# Patient Record
Sex: FEMALE | Race: BLACK OR AFRICAN AMERICAN | NOT HISPANIC OR LATINO | URBAN - METROPOLITAN AREA
[De-identification: names, ages, dates, MRNs, and addresses within clinical notes are randomized per-mention and may not be internally consistent; named-entity substitution may affect disease eponyms.]

---

## 2021-09-10 ENCOUNTER — EMERGENCY (EMERGENCY)
Facility: HOSPITAL | Age: 26
LOS: 1 days | Discharge: HOME | End: 2021-09-10
Attending: EMERGENCY MEDICINE
Payer: COMMERCIAL

## 2021-09-10 VITALS
HEIGHT: 67 IN | WEIGHT: 245.82 LBS | HEART RATE: 76 BPM | RESPIRATION RATE: 14 BRPM | OXYGEN SATURATION: 99 % | TEMPERATURE: 98 F | SYSTOLIC BLOOD PRESSURE: 124 MMHG | DIASTOLIC BLOOD PRESSURE: 72 MMHG

## 2021-09-10 DIAGNOSIS — Z98.890 OTHER SPECIFIED POSTPROCEDURAL STATES: Chronic | ICD-10-CM

## 2021-09-10 LAB
ANION GAP SERPL CALC-SCNC: 14 MMOL/L — SIGNIFICANT CHANGE UP (ref 5–17)
APTT BLD: 26.4 SEC — LOW (ref 27.5–35.5)
BASOPHILS # BLD AUTO: 0.04 K/UL — SIGNIFICANT CHANGE UP (ref 0–0.2)
BASOPHILS NFR BLD AUTO: 0.4 % — SIGNIFICANT CHANGE UP (ref 0–2)
BUN SERPL-MCNC: 16.8 MG/DL — SIGNIFICANT CHANGE UP (ref 8–20)
CALCIUM SERPL-MCNC: 9.2 MG/DL — SIGNIFICANT CHANGE UP (ref 8.6–10.2)
CHLORIDE SERPL-SCNC: 100 MMOL/L — SIGNIFICANT CHANGE UP (ref 98–107)
CO2 SERPL-SCNC: 21 MMOL/L — LOW (ref 22–29)
CREAT SERPL-MCNC: 0.82 MG/DL — SIGNIFICANT CHANGE UP (ref 0.5–1.3)
EOSINOPHIL # BLD AUTO: 0.19 K/UL — SIGNIFICANT CHANGE UP (ref 0–0.5)
EOSINOPHIL NFR BLD AUTO: 1.9 % — SIGNIFICANT CHANGE UP (ref 0–6)
GLUCOSE SERPL-MCNC: 86 MG/DL — SIGNIFICANT CHANGE UP (ref 70–99)
HCT VFR BLD CALC: 37.9 % — SIGNIFICANT CHANGE UP (ref 34.5–45)
HGB BLD-MCNC: 11.9 G/DL — SIGNIFICANT CHANGE UP (ref 11.5–15.5)
IMM GRANULOCYTES NFR BLD AUTO: 0.3 % — SIGNIFICANT CHANGE UP (ref 0–1.5)
INR BLD: 1.09 RATIO — SIGNIFICANT CHANGE UP (ref 0.88–1.16)
LYMPHOCYTES # BLD AUTO: 3.44 K/UL — HIGH (ref 1–3.3)
LYMPHOCYTES # BLD AUTO: 34.6 % — SIGNIFICANT CHANGE UP (ref 13–44)
MCHC RBC-ENTMCNC: 25.5 PG — LOW (ref 27–34)
MCHC RBC-ENTMCNC: 31.4 GM/DL — LOW (ref 32–36)
MCV RBC AUTO: 81.2 FL — SIGNIFICANT CHANGE UP (ref 80–100)
MONOCYTES # BLD AUTO: 0.66 K/UL — SIGNIFICANT CHANGE UP (ref 0–0.9)
MONOCYTES NFR BLD AUTO: 6.6 % — SIGNIFICANT CHANGE UP (ref 2–14)
NEUTROPHILS # BLD AUTO: 5.58 K/UL — SIGNIFICANT CHANGE UP (ref 1.8–7.4)
NEUTROPHILS NFR BLD AUTO: 56.2 % — SIGNIFICANT CHANGE UP (ref 43–77)
PLATELET # BLD AUTO: 252 K/UL — SIGNIFICANT CHANGE UP (ref 150–400)
POTASSIUM SERPL-MCNC: 4.8 MMOL/L — SIGNIFICANT CHANGE UP (ref 3.5–5.3)
POTASSIUM SERPL-SCNC: 4.8 MMOL/L — SIGNIFICANT CHANGE UP (ref 3.5–5.3)
PROTHROM AB SERPL-ACNC: 12.6 SEC — SIGNIFICANT CHANGE UP (ref 10.6–13.6)
RBC # BLD: 4.67 M/UL — SIGNIFICANT CHANGE UP (ref 3.8–5.2)
RBC # FLD: 13.3 % — SIGNIFICANT CHANGE UP (ref 10.3–14.5)
SODIUM SERPL-SCNC: 135 MMOL/L — SIGNIFICANT CHANGE UP (ref 135–145)
WBC # BLD: 9.94 K/UL — SIGNIFICANT CHANGE UP (ref 3.8–10.5)
WBC # FLD AUTO: 9.94 K/UL — SIGNIFICANT CHANGE UP (ref 3.8–10.5)

## 2021-09-10 PROCEDURE — 99284 EMERGENCY DEPT VISIT MOD MDM: CPT

## 2021-09-10 PROCEDURE — 85610 PROTHROMBIN TIME: CPT

## 2021-09-10 PROCEDURE — 99283 EMERGENCY DEPT VISIT LOW MDM: CPT

## 2021-09-10 PROCEDURE — 85730 THROMBOPLASTIN TIME PARTIAL: CPT

## 2021-09-10 PROCEDURE — 80048 BASIC METABOLIC PNL TOTAL CA: CPT

## 2021-09-10 PROCEDURE — 36415 COLL VENOUS BLD VENIPUNCTURE: CPT

## 2021-09-10 PROCEDURE — 85025 COMPLETE CBC W/AUTO DIFF WBC: CPT

## 2021-09-10 RX ORDER — APIXABAN 2.5 MG/1
2 TABLET, FILM COATED ORAL
Qty: 120 | Refills: 0
Start: 2021-09-10 | End: 2021-10-09

## 2021-09-10 RX ORDER — APIXABAN 2.5 MG/1
10 TABLET, FILM COATED ORAL ONCE
Refills: 0 | Status: COMPLETED | OUTPATIENT
Start: 2021-09-10 | End: 2021-09-10

## 2021-09-10 RX ADMIN — APIXABAN 10 MILLIGRAM(S): 2.5 TABLET, FILM COATED ORAL at 22:40

## 2021-09-10 NOTE — ED PROVIDER NOTE - OBJECTIVE STATEMENT
27 y/o female with recent bunion surgery on right foot, POD #5 presents to the ED with calf pain and DVT in R calf on outpatient imaging. Notes right calf pain started 5 days ago. No active chest pain , sob, fevers, chills, no hx of blood clots, no bleeding disorders in the past. Notes she had intermittent chest pain since covid vaccine in april 2021. Seen cardio for this with negative workup. No change in episodes of chest pain. Denies shortness of rbeath

## 2021-09-10 NOTE — ED ADULT TRIAGE NOTE - CHIEF COMPLAINT QUOTE
Patient A&Ox4 complaining of pain to LLE, stated had bunion surgery 08/17/2021. Was experiencing pain to back of right calf x5 days. Went to MD for followup & had US at [facility], was told she is positive for DVT.

## 2021-09-10 NOTE — ED ADULT NURSE NOTE - OBJECTIVE STATEMENT
Assumed care of pt at 21:24 in . Pt A&Ox3 c/o dx'ed DVT secondary to RLE fx. Pt dx'ed w/ DVT outpatient, Assumed care of pt at 21:24 in . Pt A&Ox3 c/o dx'ed DVT secondary to RLE fx. Pt dx'ed w/ DVT outpatient, told to f/u W/ ED for further intervention. Pt denies difficulty breathing, SOB, increased swelling.

## 2021-09-10 NOTE — ED PROVIDER NOTE - CARE PROVIDER_API CALL
Kal Osborne)  Hematology; Internal Medicine; Medical Oncology  [address] [place] [address]  Phone: [phone]  Fax: [phone]  Follow Up Time:

## 2021-09-10 NOTE — ED PROVIDER NOTE - PATIENT PORTAL LINK FT
You can access the FollowMyHealth Patient Portal offered by [facility] by registering at the following website: [email or web address]. By joining [facility]’s FollowMyHealth portal, you will also be able to view your health information using other applications (apps) compatible with our system.

## 2021-09-10 NOTE — ED ADULT NURSE NOTE - NS ED NURSE LEVEL OF CONSCIOUSNESS SPEECH
SUBJECTIVE:  The patient is here basically for medication refills.  She was here with her sister and a Chinese .  It ended up her vitamin D high dose she was not taking, it really was not clear why but the sister helped convince Bridgette that she should finish that out.  The trazodone seems to be helping with sleep and stool softeners working well for constipation.  She did have some significant problem with hemorrhoids previously but it sounds like that is doing well and then she had a different cream for her itching and dry skin that she apparently got from [facility] which is working better than what I prescribed for her before so they wanted a refill of that which was ammonium lactate 12% cream.      OBJECTIVE:  On exam, the patient is in no acute distress.  Vital signs are stable.  No further exam was done.      IMPRESSION:  Stable medical problems.      PLAN:  We actually did not need refills of anything other than the ammonium lactate cream which I went ahead and prescribed for her with refills.        Visit length 15 minutes, all spent counseling about her medications.        Speaking Coherently

## 2021-09-10 NOTE — ED PROVIDER NOTE - ATTENDING CONTRIBUTION TO CARE
Dr. Munoz : I have personally seen and examined this patient at the bedside. I have fully participated in the care of this patient. I have reviewed all pertinent clinical information, including history, physical exam, plan and the pa's note and agree except as noted.     27 y/o female with recent bunion surgery on right foot, POD #5 presents to the ED with calf pain and DVT in R calf on outpatient imaging. pain in calf x 5 days. . Notes she had intermittent chest pain since covid vaccine in april 2021. Seen cardio for this with negative workup. no new cp since the calf pain.  No change       Denies f/c/n/v/sob/palpitations/cough/abd.pain/d/c/dysuria/hematuria. no sick contacts/recent travel.    PE:  head; atraumatic normocephalic  eyes: perrla  Heart: rrr s1s2  lungs: ctab  abd: soft, nt nd + bs no rebound/guarding no cva ttp  le: right calf ttp and swelling   back: no midline cervical/thoracic/lumbar ttp      -->calf dvt; labs place on AC dc

## 2021-09-10 NOTE — ED PROVIDER NOTE - CLINICAL SUMMARY MEDICAL DECISION MAKING FREE TEXT BOX
25 y/o female with recent bunion surgery on right foot, POD #5 presents to the ED with calf pain and DVT in R calf on outpatient imaging. Outpatient us shows dvt, (scanned in chart), intermittent chest pain since arpil, has been worked up by cardio, no active chest pain, no shortness of breath, not tachycardic, sating at 99%. labs show no abnormalities in kidney function

## 2021-09-17 PROBLEM — Z78.9 OTHER SPECIFIED HEALTH STATUS: Chronic | Status: ACTIVE | Noted: 2021-09-10

## 2021-09-24 ENCOUNTER — OUTPATIENT (OUTPATIENT)
Dept: OUTPATIENT SERVICES | Facility: HOSPITAL | Age: 26
LOS: 1 days | Discharge: HOME | End: 2021-09-24

## 2021-09-24 DIAGNOSIS — I82.A22 CHRONIC EMBOLISM AND THROMBOSIS OF LEFT AXILLARY VEIN: ICD-10-CM

## 2021-09-24 DIAGNOSIS — Z98.890 OTHER SPECIFIED POSTPROCEDURAL STATES: Chronic | ICD-10-CM

## 2021-09-24 PROBLEM — Z00.00 ENCOUNTER FOR PREVENTIVE HEALTH EXAMINATION: Status: ACTIVE | Noted: 2021-09-24

## 2021-09-28 ENCOUNTER — APPOINTMENT (OUTPATIENT)
Dept: HEMATOLOGY ONCOLOGY | Facility: CLINIC | Age: 26
End: 2021-09-28
Payer: MEDICAID

## 2021-09-28 ENCOUNTER — RESULT REVIEW (OUTPATIENT)
Age: 26
End: 2021-09-28

## 2021-09-28 ENCOUNTER — OUTPATIENT (OUTPATIENT)
Dept: OUTPATIENT SERVICES | Facility: HOSPITAL | Age: 26
LOS: 1 days | End: 2021-09-28

## 2021-09-28 VITALS
DIASTOLIC BLOOD PRESSURE: 71 MMHG | BODY MASS INDEX: 37.54 KG/M2 | HEIGHT: 67.5 IN | OXYGEN SATURATION: 97 % | WEIGHT: 242 LBS | HEART RATE: 62 BPM | SYSTOLIC BLOOD PRESSURE: 106 MMHG

## 2021-09-28 DIAGNOSIS — Z78.9 OTHER SPECIFIED HEALTH STATUS: ICD-10-CM

## 2021-09-28 DIAGNOSIS — Z82.3 FAMILY HISTORY OF STROKE: ICD-10-CM

## 2021-09-28 DIAGNOSIS — I82.A22 CHRONIC EMBOLISM AND THROMBOSIS OF LEFT AXILLARY VEIN: ICD-10-CM

## 2021-09-28 DIAGNOSIS — Z82.49 FAMILY HISTORY OF ISCHEMIC HEART DISEASE AND OTHER DISEASES OF THE CIRCULATORY SYSTEM: ICD-10-CM

## 2021-09-28 DIAGNOSIS — Z98.890 OTHER SPECIFIED POSTPROCEDURAL STATES: Chronic | ICD-10-CM

## 2021-09-28 LAB
BASOPHILS # BLD AUTO: 0.1 K/UL — SIGNIFICANT CHANGE UP (ref 0–0.2)
BASOPHILS NFR BLD AUTO: 1.1 % — SIGNIFICANT CHANGE UP (ref 0–2)
EOSINOPHIL # BLD AUTO: 0.3 K/UL — SIGNIFICANT CHANGE UP (ref 0–0.5)
EOSINOPHIL NFR BLD AUTO: 3.4 % — SIGNIFICANT CHANGE UP (ref 0–6)
HCT VFR BLD CALC: 38.5 % — SIGNIFICANT CHANGE UP (ref 34.5–45)
HGB BLD-MCNC: 11.9 G/DL — SIGNIFICANT CHANGE UP (ref 11.5–15.5)
LYMPHOCYTES # BLD AUTO: 2.2 K/UL — SIGNIFICANT CHANGE UP (ref 1–3.3)
LYMPHOCYTES # BLD AUTO: 28.8 % — SIGNIFICANT CHANGE UP (ref 13–44)
MCHC RBC-ENTMCNC: 25.5 PG — LOW (ref 27–34)
MCHC RBC-ENTMCNC: 31 G/DL — LOW (ref 32–36)
MCV RBC AUTO: 82.3 FL — SIGNIFICANT CHANGE UP (ref 80–100)
MONOCYTES # BLD AUTO: 0.7 K/UL — SIGNIFICANT CHANGE UP (ref 0–0.9)
MONOCYTES NFR BLD AUTO: 8.5 % — SIGNIFICANT CHANGE UP (ref 2–14)
NEUTROPHILS # BLD AUTO: 4.5 K/UL — SIGNIFICANT CHANGE UP (ref 1.8–7.4)
NEUTROPHILS NFR BLD AUTO: 58.2 % — SIGNIFICANT CHANGE UP (ref 43–77)
PLATELET # BLD AUTO: 270 K/UL — SIGNIFICANT CHANGE UP (ref 150–400)
RBC # BLD: 4.68 M/UL — SIGNIFICANT CHANGE UP (ref 3.8–5.2)
RBC # FLD: 12.4 % — SIGNIFICANT CHANGE UP (ref 10.3–14.5)
WBC # BLD: 7.7 K/UL — SIGNIFICANT CHANGE UP (ref 3.8–10.5)
WBC # FLD AUTO: 7.7 K/UL — SIGNIFICANT CHANGE UP (ref 3.8–10.5)

## 2021-09-28 PROCEDURE — 99204 OFFICE O/P NEW MOD 45 MIN: CPT

## 2021-09-28 RX ORDER — APIXABAN 5 MG/1
5 TABLET, FILM COATED ORAL
Qty: 60 | Refills: 6 | Status: ACTIVE | COMMUNITY
Start: 2021-09-28 | End: 1900-01-01

## 2021-09-28 RX ORDER — APIXABAN 5 MG/1
5 TABLET, FILM COATED ORAL
Refills: 0 | Status: ACTIVE | COMMUNITY

## 2021-09-28 NOTE — ASSESSMENT
[FreeTextEntry1] : 27 y/o with no significant past medical history s/p recent R bunion surgery on 8/17/21 \par Post surgery she developed a  Rt LE  DVTon 9/10/21 \par \par Patient with what appears to be an unprovoked DVT\par - denies personal or family h/o VTE prior to this\par - Does not defeinitively require a Hypercoaguble w/u at at this time, However MOm is concerned about possibility of hypercoagubility so requests for it \par -Will send  Prothrombin gene mutation/ factor V leiden mutation- Send Cardiolipin / B2 Glycoprotein/ ATIII/ Protein C/ s. Cannot sent Lupus AC given that she is on eliquis. Low index of suspicion for LA\par - Patient understands that he will require 3- 6 months of AC for a LE DVT\par - Extended AC beyond that would depend on the hypercoagulable w/u\par - Prescription refills for Eliquis sent to [facility] \par F/U in 3 months

## 2021-09-28 NOTE — HISTORY OF PRESENT ILLNESS
[de-identified] : 25 y/o with no significant past medical history presents today s/p recent R bunion surgery on 8/17/21 \par  She was referred to our office with a Rt LE  DVTon 9/10/21 post surgery \par \par Patient was not on any AC prophylaxis post surgery She was given ketorolac and antibiotics\par \par No Hx of Anemia \par No personal or FHx of VTE \par \par Mother - 1 miscarriage \par \par \par \par DATA: \par  09/10/2021:Doppler US DVT in right vein in the proximal right calf \par Patient presented to [facility] and was started on Eliquis\par  [de-identified] : Patient here today with initial visit with her mom \par \par Eliquis - currently on 5 bid . Tolerating it well. Reports fatigue \par Had her period, with heavy bleeding \par no other significant bleeding with eliquis\par \par Recieved COVID vaccine 04/21 - Experienced chest pain but resolved in a few hours\par  \par \par

## 2021-10-04 ENCOUNTER — APPOINTMENT (OUTPATIENT)
Dept: VASCULAR SURGERY | Facility: CLINIC | Age: 26
End: 2021-10-04
Payer: MEDICAID

## 2021-10-04 VITALS — HEART RATE: 66 BPM | OXYGEN SATURATION: 99 % | DIASTOLIC BLOOD PRESSURE: 72 MMHG | SYSTOLIC BLOOD PRESSURE: 110 MMHG

## 2021-10-04 PROCEDURE — 99203 OFFICE O/P NEW LOW 30 MIN: CPT

## 2021-10-04 NOTE — HISTORY OF PRESENT ILLNESS
[FreeTextEntry1] : 25 y/o with no significant past medical history presents for evaluation of a provoked RLE DVT.\par She underwent right foot bunion surgery on 8/17/21 and had limited ambulation after the surgery\par She developed right calf pain and right foot swelling. A venous duplex performed on 9/10/21 revealed right posterior tibial DVT. Right calf swelling has since resolved.\par She uses knee high compression stockings daily\par She has since been on anticoagulation with Eliquis. She was recently seen by hematology and a hypercoagulable work-up is in process.\par

## 2021-10-04 NOTE — ASSESSMENT
[FreeTextEntry1] : 26 year old female with a provoked right posterior tibial DVT following right foot podiatric surgery.\par Her swelling and pain have resolved. She remains on therapeutic AC with Eliquis.\par She will return in December (after 3 months of AC) for a repeat venous US. If DVT has resolved and her hypercoagulable work-up is negative, I will discontinue her anticoagulation

## 2021-10-04 NOTE — PHYSICAL EXAM
[Normal Breath Sounds] : Normal breath sounds [Normal Rate and Rhythm] : normal rate and rhythm [2+] : left 2+ [JVD] : no jugular venous distention  [de-identified] : Well appearing [de-identified] : NCAT

## 2021-10-07 LAB
ALBUMIN SERPL ELPH-MCNC: 4.4 G/DL
ALP BLD-CCNC: 68 U/L
ALT SERPL-CCNC: 9 U/L
ANION GAP SERPL CALC-SCNC: 11 MMOL/L
AST SERPL-CCNC: 15 U/L
AT III PPP CHRO-ACNC: 121 %
B2 GLYCOPROT1 IGA SERPL IA-ACNC: <5 SAU
B2 GLYCOPROT1 IGG SER-ACNC: <5 SGU
B2 GLYCOPROT1 IGM SER-ACNC: <5 SMU
BILIRUB SERPL-MCNC: 0.2 MG/DL
BUN SERPL-MCNC: 13 MG/DL
CALCIUM SERPL-MCNC: 9.7 MG/DL
CARDIOLIPIN IGM SER-MCNC: 15 MPL
CARDIOLIPIN IGM SER-MCNC: 5.2 GPL
CHLORIDE SERPL-SCNC: 104 MMOL/L
CO2 SERPL-SCNC: 24 MMOL/L
CREAT SERPL-MCNC: 0.82 MG/DL
DEPRECATED D DIMER PPP IA-ACNC: <150 NG/ML DDU
DNA PLOIDY SPEC FC-IMP: NORMAL
GLUCOSE SERPL-MCNC: 96 MG/DL
POTASSIUM SERPL-SCNC: 4.2 MMOL/L
PROT C PPP CHRO-ACNC: 130 %
PROT S FREE PPP-ACNC: 102 %
PROT SERPL-MCNC: 7.7 G/DL
PTR INTERP: NORMAL
SODIUM SERPL-SCNC: 140 MMOL/L

## 2021-12-13 ENCOUNTER — APPOINTMENT (OUTPATIENT)
Dept: VASCULAR SURGERY | Facility: CLINIC | Age: 26
End: 2021-12-13
Payer: MEDICAID

## 2021-12-13 VITALS — DIASTOLIC BLOOD PRESSURE: 79 MMHG | OXYGEN SATURATION: 97 % | HEART RATE: 78 BPM | SYSTOLIC BLOOD PRESSURE: 125 MMHG

## 2021-12-13 PROCEDURE — 99212 OFFICE O/P EST SF 10 MIN: CPT

## 2021-12-13 PROCEDURE — 93971 EXTREMITY STUDY: CPT

## 2021-12-13 NOTE — HISTORY OF PRESENT ILLNESS
[FreeTextEntry1] : 25 y/o with no significant past medical history presents for follow upof a provoked RLE DVT.\par She underwent right foot bunion surgery on 8/17/21 and had limited ambulation after the surgery\par She developed right calf pain and right foot swelling. A venous duplex performed on 9/10/21 revealed right posterior tibial DVT. Right calf swelling has since resolved.\par She uses knee high compression stockings daily\par She has since been on anticoagulation with Eliquis. She was recently seen by hematology and a hypercoagulable work-up is in process.\par  [de-identified] : Patient was last seen 2 months ago\par She remains on anticoagulation with Eliquis\par Right leg edema has resolved

## 2021-12-13 NOTE — ASSESSMENT
[FreeTextEntry1] : 26 year old female with a provoked right posterior tibial DVT following right foot podiatric surgery in August 2021\par Her swelling and pain have resolved. \par She has been on therapeutic AC with Eliquis for a 3 month course\par Right leg venous ultrasound today shows no DVT or SVT.\par -Patient is scheduled to see her hematologist next week. If her hypercoagulable work-up is negative, i recommend immediate discontinuation of AC as her DVT has fully resolved and she has completed a 3 month course of AC\par -Patient advised to continue using compression stockings particularly with long distance travel\par -She may return PRN\par

## 2021-12-13 NOTE — PHYSICAL EXAM
[JVD] : no jugular venous distention  [Normal Breath Sounds] : Normal breath sounds [Normal Rate and Rhythm] : normal rate and rhythm [2+] : left 2+ [Ankle Swelling (On Exam)] : not present [de-identified] : Well appearing [de-identified] : NCAT

## 2021-12-21 ENCOUNTER — OUTPATIENT (OUTPATIENT)
Dept: OUTPATIENT SERVICES | Facility: HOSPITAL | Age: 26
LOS: 1 days | Discharge: HOME | End: 2021-12-21

## 2021-12-21 DIAGNOSIS — Z98.890 OTHER SPECIFIED POSTPROCEDURAL STATES: Chronic | ICD-10-CM

## 2021-12-21 DIAGNOSIS — I82.A22 CHRONIC EMBOLISM AND THROMBOSIS OF LEFT AXILLARY VEIN: ICD-10-CM

## 2021-12-22 ENCOUNTER — RESULT REVIEW (OUTPATIENT)
Age: 26
End: 2021-12-22

## 2021-12-22 ENCOUNTER — APPOINTMENT (OUTPATIENT)
Dept: HEMATOLOGY ONCOLOGY | Facility: CLINIC | Age: 26
End: 2021-12-22
Payer: MEDICAID

## 2021-12-22 VITALS
SYSTOLIC BLOOD PRESSURE: 104 MMHG | HEIGHT: 67.5 IN | BODY MASS INDEX: 36.46 KG/M2 | WEIGHT: 235.02 LBS | OXYGEN SATURATION: 97 % | HEART RATE: 69 BPM | DIASTOLIC BLOOD PRESSURE: 69 MMHG

## 2021-12-22 LAB
BASOPHILS # BLD AUTO: 0.1 K/UL — SIGNIFICANT CHANGE UP (ref 0–0.2)
BASOPHILS NFR BLD AUTO: 1.2 % — SIGNIFICANT CHANGE UP (ref 0–2)
EOSINOPHIL # BLD AUTO: 0.1 K/UL — SIGNIFICANT CHANGE UP (ref 0–0.5)
EOSINOPHIL NFR BLD AUTO: 0.9 % — SIGNIFICANT CHANGE UP (ref 0–6)
HCT VFR BLD CALC: 43.4 % — SIGNIFICANT CHANGE UP (ref 34.5–45)
HGB BLD-MCNC: 13 G/DL — SIGNIFICANT CHANGE UP (ref 11.5–15.5)
LYMPHOCYTES # BLD AUTO: 2.3 K/UL — SIGNIFICANT CHANGE UP (ref 1–3.3)
LYMPHOCYTES # BLD AUTO: 34.9 % — SIGNIFICANT CHANGE UP (ref 13–44)
MCHC RBC-ENTMCNC: 25.4 PG — LOW (ref 27–34)
MCHC RBC-ENTMCNC: 29.9 G/DL — LOW (ref 32–36)
MCV RBC AUTO: 84.8 FL — SIGNIFICANT CHANGE UP (ref 80–100)
MONOCYTES # BLD AUTO: 0.6 K/UL — SIGNIFICANT CHANGE UP (ref 0–0.9)
MONOCYTES NFR BLD AUTO: 9 % — SIGNIFICANT CHANGE UP (ref 2–14)
NEUTROPHILS # BLD AUTO: 3.6 K/UL — SIGNIFICANT CHANGE UP (ref 1.8–7.4)
NEUTROPHILS NFR BLD AUTO: 54.1 % — SIGNIFICANT CHANGE UP (ref 43–77)
PLATELET # BLD AUTO: 258 K/UL — SIGNIFICANT CHANGE UP (ref 150–400)
RBC # BLD: 5.11 M/UL — SIGNIFICANT CHANGE UP (ref 3.8–5.2)
RBC # FLD: 12.4 % — SIGNIFICANT CHANGE UP (ref 10.3–14.5)
WBC # BLD: 6.6 K/UL — SIGNIFICANT CHANGE UP (ref 3.8–10.5)
WBC # FLD AUTO: 6.6 K/UL — SIGNIFICANT CHANGE UP (ref 3.8–10.5)

## 2021-12-22 PROCEDURE — 99214 OFFICE O/P EST MOD 30 MIN: CPT

## 2021-12-22 NOTE — ADDENDUM
[FreeTextEntry1] : Documented by Greg Jacques acting as scribe for Dr. Darling on 09/28/2021. \par \par All Medical record entries made by the Scribe were at my, Dr. Darling's, direction and personally dictated by me on 09/28/2021. I have reviewed the chart and agree that the record accurately reflects my personal performance of the history, physical exam, assessment and plan. I have also personally directed, reviewed, and agreed with the discharge instructions.

## 2021-12-22 NOTE — HISTORY OF PRESENT ILLNESS
[de-identified] : 25 y/o with no significant past medical history presents today s/p recent R bunion surgery on 8/17/21 \par She was referred to our office with a Rt LE  DVT on 9/10/21 post surgery.\par \par Patient was not on any AC prophylaxis post surgery She was given ketorolac and antibiotics\par \par No Hx of Anemia \par No personal or FHx of VTE \par \par Mother - 1 miscarriage \par \par \par \par DATA: \par  09/10/2021:Doppler US DVT in right vein in the proximal right calf \par Patient presented to [facility] and was started on Eliquis\par  [de-identified] : Patient here today for follow up.\par Saw vascular surgeon, Dr. Lennon, on 12/13/21: Right leg venous US: No evidence of DVT or SVT. \par \par Eliquis - currently on 5 BID. Tolerating it well. \par Had her irregular periods, reports severe menstrual cramps  \par no other significant bleeding with eliquis\par \par Recieved COVID vaccine 04/21 - Experienced chest pain but resolved in a few hours\par \par 09/28/2021 Factor V Leiden Normal, Prothrombin Gene Mutation Normal, Protein C 130%, Protein S 102%, Beta 2 Glycoprotein IgM < 5, Beta 2 Glycoprotein IgG <5, Beta 2 Glycoprotein IgA <5, Anticardiolipin IgM 15, Anticardiolipin IgG 5.2 \par \par Notes that she is traveling to [facility] on Jan. 12, 2022 for a week. Advised compression stockings and walk on the plane.   \par

## 2021-12-22 NOTE — ASSESSMENT
[FreeTextEntry1] : 27 y/o with no significant past medical history s/p recent R bunion surgery on 8/17/21. \par Post surgery she developed a  Rt LE  DVT on 9/10/21 \par Repeat Doppler in Dec 2021 per Vascular NO evidence of DVT SVT\par \par \par Patient with what appears to be an unprovoked DVT\par - denies personal or family h/o VTE prior to this\par - hypercoaguble w/u done which was essentially negative \par    09/28/2021 Prothrombin gene mutation/Factor V Leiden mutation: Negative. Protein C 130%, Protein S 102%, Beta 2 Glycoprotein IgM < 5, Beta 2 Glycoprotein IgG <5, Beta 2 Glycoprotein IgA <5, Anticardiolipin IgG 5.2 \par - GIven INdeterminate anticardiolipin IgM  AB level, at 15, will repeat Cardiolipin IgM today. \par - Patient reports that she is traveling to [facility] on Jan. 12, 2022 for a week. Advised compression stockings and walk on the plane.   She will continue eliquis till she returns from [facility] and them discontinue\par - she is aware that having 1 VTE puts her at a higher risk of subsequent VTE\par - In 2 months will order Lupus AC when she is off the Eliquis\par - F/U in 3-4 months

## 2021-12-28 LAB
CARDIOLIPIN IGM SER-MCNC: 7.9 MPL
CARDIOLIPIN IGM SER-MCNC: <5 GPL

## 2022-02-17 ENCOUNTER — OUTPATIENT (OUTPATIENT)
Dept: OUTPATIENT SERVICES | Facility: HOSPITAL | Age: 27
LOS: 1 days | Discharge: HOME | End: 2022-02-17

## 2022-02-17 DIAGNOSIS — Z98.890 OTHER SPECIFIED POSTPROCEDURAL STATES: Chronic | ICD-10-CM

## 2022-02-17 DIAGNOSIS — I82.A22 CHRONIC EMBOLISM AND THROMBOSIS OF LEFT AXILLARY VEIN: ICD-10-CM

## 2022-02-22 ENCOUNTER — APPOINTMENT (OUTPATIENT)
Dept: HEMATOLOGY ONCOLOGY | Facility: CLINIC | Age: 27
End: 2022-02-22

## 2022-02-22 ENCOUNTER — LABORATORY RESULT (OUTPATIENT)
Age: 27
End: 2022-02-22

## 2022-02-22 ENCOUNTER — RESULT REVIEW (OUTPATIENT)
Age: 27
End: 2022-02-22

## 2022-02-22 LAB
BASOPHILS # BLD AUTO: 0.1 K/UL — SIGNIFICANT CHANGE UP (ref 0–0.2)
BASOPHILS NFR BLD AUTO: 1 % — SIGNIFICANT CHANGE UP (ref 0–2)
EOSINOPHIL # BLD AUTO: 0.1 K/UL — SIGNIFICANT CHANGE UP (ref 0–0.5)
EOSINOPHIL NFR BLD AUTO: 1.3 % — SIGNIFICANT CHANGE UP (ref 0–6)
HCT VFR BLD CALC: 37.6 % — SIGNIFICANT CHANGE UP (ref 34.5–45)
HGB BLD-MCNC: 11.6 G/DL — SIGNIFICANT CHANGE UP (ref 11.5–15.5)
LYMPHOCYTES # BLD AUTO: 3.2 K/UL — SIGNIFICANT CHANGE UP (ref 1–3.3)
LYMPHOCYTES # BLD AUTO: 38.3 % — SIGNIFICANT CHANGE UP (ref 13–44)
MCHC RBC-ENTMCNC: 26.2 PG — LOW (ref 27–34)
MCHC RBC-ENTMCNC: 31 G/DL — LOW (ref 32–36)
MCV RBC AUTO: 84.5 FL — SIGNIFICANT CHANGE UP (ref 80–100)
MONOCYTES # BLD AUTO: 0.6 K/UL — SIGNIFICANT CHANGE UP (ref 0–0.9)
MONOCYTES NFR BLD AUTO: 7.5 % — SIGNIFICANT CHANGE UP (ref 2–14)
NEUTROPHILS # BLD AUTO: 4.4 K/UL — SIGNIFICANT CHANGE UP (ref 1.8–7.4)
NEUTROPHILS NFR BLD AUTO: 52 % — SIGNIFICANT CHANGE UP (ref 43–77)
PLATELET # BLD AUTO: 231 K/UL — SIGNIFICANT CHANGE UP (ref 150–400)
RBC # BLD: 4.44 M/UL — SIGNIFICANT CHANGE UP (ref 3.8–5.2)
RBC # FLD: 13.5 % — SIGNIFICANT CHANGE UP (ref 10.3–14.5)
WBC # BLD: 8.5 K/UL — SIGNIFICANT CHANGE UP (ref 3.8–10.5)
WBC # FLD AUTO: 8.5 K/UL — SIGNIFICANT CHANGE UP (ref 3.8–10.5)

## 2022-04-07 ENCOUNTER — OUTPATIENT (OUTPATIENT)
Dept: OUTPATIENT SERVICES | Facility: HOSPITAL | Age: 27
LOS: 1 days | Discharge: HOME | End: 2022-04-07

## 2022-04-07 DIAGNOSIS — Z98.890 OTHER SPECIFIED POSTPROCEDURAL STATES: Chronic | ICD-10-CM

## 2022-04-07 DIAGNOSIS — I82.A22 CHRONIC EMBOLISM AND THROMBOSIS OF LEFT AXILLARY VEIN: ICD-10-CM

## 2022-04-11 ENCOUNTER — APPOINTMENT (OUTPATIENT)
Dept: HEMATOLOGY ONCOLOGY | Facility: CLINIC | Age: 27
End: 2022-04-11
Payer: MEDICAID

## 2022-04-11 VITALS
DIASTOLIC BLOOD PRESSURE: 76 MMHG | OXYGEN SATURATION: 99 % | WEIGHT: 235 LBS | HEIGHT: 67.5 IN | SYSTOLIC BLOOD PRESSURE: 118 MMHG | BODY MASS INDEX: 36.45 KG/M2 | HEART RATE: 56 BPM

## 2022-04-11 DIAGNOSIS — I82.409 ACUTE EMBOLISM AND THROMBOSIS OF UNSPECIFIED DEEP VEINS OF UNSPECIFIED LOWER EXTREMITY: ICD-10-CM

## 2022-04-11 PROCEDURE — 99214 OFFICE O/P EST MOD 30 MIN: CPT

## 2022-04-11 NOTE — ASSESSMENT
[FreeTextEntry1] : 27 y/o with no significant past medical history s/p recent R bunion surgery on 8/17/21. \par Post surgery she developed a  Rt LE  DVT on 9/10/21 \par Repeat Doppler in Dec 2021 per Vascular NO evidence of DVT SVT\par She is not off eliquis since Jan 2022\par \par \par Patient with what appears to be an unprovoked DVT\par -Recieved COVID vaccine 04/21\par - denies personal or family h/o VTE prior to this\par - hypercoaguble w/u done which was essentially negative \par    09/28/2021 Prothrombin gene mutation/Factor V Leiden mutation: Negative. Protein C 130%, Protein S 102%, Beta 2 Glycoprotein IgM < 5, Beta 2 Glycoprotein IgG <5, Beta 2 Glycoprotein IgA <5, Anticardiolipin IgG 5.2  \par - Patient reports that she is traveling to [place]. Advised compression stockings and walk on the plane.\par - Discontinued Eliquis end of January \par - 2/2/22: DRVVT negative, Silica clotting time NEG, BRG negative, Cardiolipin negative\par - She is aware that having 1 VTE puts her at a higher risk of subsequent VTE\par - Planning for next bunionectomy \par - F/u in October/ November 2022 prior to surgery, WIll plan for Prophylactic dose eliquis post surgery \par \par \par

## 2022-04-11 NOTE — HISTORY OF PRESENT ILLNESS
[de-identified] : 25 y/o with no significant past medical history presents today s/p recent R bunion surgery on 8/17/21 \par She was referred to our office with a Rt LE  DVT on 9/10/21 post surgery.\par \par Patient was not on any AC prophylaxis post surgery She was given ketorolac and antibiotics\par \par No Hx of Anemia \par No personal or FHx of VTE \par \par Mother - 1 miscarriage \par \par \par DATA: \par  09/10/2021:Doppler US DVT in right vein in the proximal right calf \par Patient presented to [facility] and was started on Eliquis\par  [de-identified] : Patient here today for follow up.\par  \par Completed Eliquis last week of January 2022\par Reports normal periods since being off of Eliquis\par Denies leg and abdominal pain \par Pt is able to walk without pain and reports incision site is healing well \par \par 2/22/22: DRVVT negative, Silica clotting time NEG, BRG negative, Cardiolipin negative\par \par 09/28/2021 Factor V Leiden Normal, Prothrombin Gene Mutation Normal, Protein C 130%, Protein S 102%, Beta 2 Glycoprotein IgM < 5, Beta 2 Glycoprotein IgG <5, Beta 2 Glycoprotein IgA <5, Anticardiolipin IgM 15, Anticardiolipin IgG 5.2 \par \par 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 Advised to wear compression stockings and mobilize legs during long travel

## 2024-11-13 NOTE — ADDENDUM
[FreeTextEntry1] : Documented by Heidy rPingle acting as scribe for Dr. Darling on 04/11/2022 \par \par All Medical record entries made by the Scribe were at my, Dr. Darling's, direction and personally dictated by me on 04/11/2022 . I have reviewed the chart and agree that the record accurately reflects my personal performance of the history, physical exam, assessment and plan. I have also personally directed, reviewed, and agreed with the discharge instructions.\par 
Age appropriate